# Patient Record
(demographics unavailable — no encounter records)

---

## 2025-04-21 NOTE — REVIEW OF SYSTEMS
[Myalgia] : no myalgia [Restriction of Motion] : restriction of motion [Bone Deformity] : no bone deformity [Redness of Joint] : no redness of joint [Changes in Gait] : changes in gait

## 2025-04-21 NOTE — HISTORY OF PRESENT ILLNESS
[FreeTextEntry6] : Called to Emergency to 2nd floor [employer] gym  Juan C/O left ankle pain, was running and rolled ankle NKDA

## 2025-04-21 NOTE — DISCUSSION/SUMMARY
[FreeTextEntry1] : 17 y.o male presents to [facility] center for left ankle sprain  V/S stable  NKDA Motrin PO given, tolerated well Counseling/education provided on rest, elevation Mom will  from school, discuss addition evaluation, Xray

## 2025-04-22 NOTE — DISCUSSION/SUMMARY
[FreeTextEntry1] : 17 y.o male presents to health center for pain management   NKDA Motrin PO given, tolerated Dispensed and labeled #3 Motrin for next dose  Discuss with mom and Ashok important of follow up with Orthopedic Support provided  Elevator pass provided  Answered all questions and concerns  D/C to class, advise to follow up with health center as needed

## 2025-04-22 NOTE — HISTORY OF PRESENT ILLNESS
[FreeTextEntry6] : Juan reports to [facility] for Motrin  Spoke with mom, was evaluated in ER, "left foot 4th bone broken"   follow up with orthopedic in place Left foot placed in soft cast, non-weight bearing, ambulated with crutches  NKDA Did not take anything for pain management today

## 2025-04-25 NOTE — HISTORY OF PRESENT ILLNESS
[de-identified] : Juan is a 17 year old male who presents to the walk in accompanied by a family member for evaluation of L foot pain s/p injury that occurred 3 days ago.  He states he was in gym when he twisted his foot causing him to fall.  He immediately felt pain and was unable to put weight on the foot.  He went to urgent care later that night where X-rays were taken however they are unavailable for my review.  The report states there is a fracture of the fourth metatarsal.  He was placed in a splint and advised to follow up with an orthopedic.  He has been using Ibuprofen with mild improvement and is using a knee rover and crutches.

## 2025-04-25 NOTE — ASSESSMENT
[FreeTextEntry1] : 17 year old male with L IV metatarsal fracture.  I have placed him in a short cam walker boot WBAT and have prescribed Nabumetone 750 mg BID prn pain with food.  He will f/u in 3 weeks for reassessment and is aware if there is any issue they can contact the office.

## 2025-04-25 NOTE — REVIEW OF SYSTEMS
solids [Joint Pain] : joint pain [Joint Stiffness] : joint stiffness [Joint Swelling] : joint swelling [Negative] : Neurological

## 2025-04-25 NOTE — HISTORY OF PRESENT ILLNESS
[de-identified] : Juan is a 17 year old male who presents to the walk in accompanied by a family member for evaluation of L foot pain s/p injury that occurred 3 days ago.  He states he was in gym when he twisted his foot causing him to fall.  He immediately felt pain and was unable to put weight on the foot.  He went to urgent care later that night where X-rays were taken however they are unavailable for my review.  The report states there is a fracture of the fourth metatarsal.  He was placed in a splint and advised to follow up with an orthopedic.  He has been using Ibuprofen with mild improvement and is using a knee rover and crutches.

## 2025-04-25 NOTE — IMAGING
[de-identified] : L foot:  + edema over dorsum of foot with ecchymosis noted, + tenderness over IV and V metatarsals, NV intact, full toe ROM with discomfort, full ankle ROM.  X-ray L foot 3 views:  non displaced fracture of the IV metatarsal.

## 2025-04-25 NOTE — IMAGING
[de-identified] : L foot:  + edema over dorsum of foot with ecchymosis noted, + tenderness over IV and V metatarsals, NV intact, full toe ROM with discomfort, full ankle ROM.  X-ray L foot 3 views:  non displaced fracture of the IV metatarsal.